# Patient Record
Sex: FEMALE | Race: WHITE
[De-identification: names, ages, dates, MRNs, and addresses within clinical notes are randomized per-mention and may not be internally consistent; named-entity substitution may affect disease eponyms.]

---

## 2019-01-28 ENCOUNTER — HOSPITAL ENCOUNTER (OUTPATIENT)
Dept: HOSPITAL 96 - M.RAD | Age: 69
End: 2019-01-28
Attending: FAMILY MEDICINE
Payer: MEDICARE

## 2019-01-28 DIAGNOSIS — N63.21: ICD-10-CM

## 2019-01-28 DIAGNOSIS — N63.23: ICD-10-CM

## 2019-01-28 DIAGNOSIS — Z12.31: Primary | ICD-10-CM

## 2019-02-01 ENCOUNTER — HOSPITAL ENCOUNTER (OUTPATIENT)
Dept: HOSPITAL 96 - M.ULTRA | Age: 69
Discharge: HOME | End: 2019-02-01
Attending: FAMILY MEDICINE
Payer: MEDICARE

## 2019-02-01 DIAGNOSIS — C50.912: Primary | ICD-10-CM

## 2019-02-01 DIAGNOSIS — J44.9: ICD-10-CM

## 2019-02-01 DIAGNOSIS — Z98.890: ICD-10-CM

## 2019-02-01 DIAGNOSIS — Z79.899: ICD-10-CM

## 2019-02-01 DIAGNOSIS — Z88.0: ICD-10-CM

## 2019-02-01 DIAGNOSIS — Z90.49: ICD-10-CM

## 2019-02-01 DIAGNOSIS — Z90.710: ICD-10-CM

## 2019-02-01 DIAGNOSIS — Z80.42: ICD-10-CM

## 2019-02-01 DIAGNOSIS — Z98.51: ICD-10-CM

## 2019-02-01 DIAGNOSIS — I10: ICD-10-CM

## 2019-02-01 DIAGNOSIS — Z80.0: ICD-10-CM

## 2019-02-01 DIAGNOSIS — Z80.8: ICD-10-CM

## 2019-02-01 DIAGNOSIS — E03.9: ICD-10-CM

## 2019-02-08 NOTE — PATH
[address]  [address]                    PATHOLOGY RPT PROCEDURE       
_______________________________________________________________________________
 
Name:       MEMEKEENAN STARK           Room:                      REG CLI 
M.R.#:  [id number]      Account #:      [id number]  
Admission:  02/01/19     Date of Birth:  [birth year]  
Discharge:                             Report #:    [id number]
                                                         Path Case #: [id number]
_______________________________________________________________________________
 
[facility] Accession Number: [id number]
.                                                                01
Material submitted:                                        .
PART A: LEFT BREAST MASS
PART B: LEFT BREAST MASS
.                                                                01
Clinical history:                                          .
A. 1.21 x 1.0 x 1.34 cm mass, 4:00, 3 cm FN
B. 3.89 x 2.87 x 3.03 cm mass, 12:30, 4 cm FN
.                                                                02
**********************************************************************
Diagnosis:
A.  Left breast mass, 4:00, 3 cm from nipple, image guided core biopsies:
- Ductal carcinoma in situ (DCIS), nuclear grade III, comedo type,
spanning 1 mm.  See comment.
.
B.  Left breast mass, 12:30, 4 cm from nipple, image guided core biopsies:
- Infiltrating ductal adenocarcinoma, intermediate grade (II/III) spanning
at least 9 mm, in association with DCIS, high-grade, comedo type.  See
comment.
.
(ZULLY:fransico; 02/04/2019)
MBR/02/04/2019
**********************************************************************
.                                                                02
Comment:
Specimen A (4:00) consists mostly of fat with focal high-grade DCIS seen
in A2.
.
Synoptic data for specimen B (12:30) is as follows:
.
Specimen type:                Image guided core biopsy
Tumor site:                   Left breast, 12:30, 4 cm from nipple
Tumor quantitation:           Approximately 80% of submitted tissues
Histologic type:              Ductal adenocarcinoma
Histologic grade:             II/III
Tubules, nuclei and mitoses:  3, 2, 2
LVSI:                         Not identified
Microcalcifications:          Not identified
Markers:                      Pending
Block:                        B3
.
Breast tumor profile studies are pending on B3 and will be the subject of
an addendum report.  Specimens A and B reviewed with Dr. Randy Aguila who
agrees with the diagnosis.
.
Pennie (acting [facility] breast navigator) notified at approximately 1420 on
2/4/2019.
 
[place] [address]                    PATHOLOGY RPT PROCEDURE       
_______________________________________________________________________________
 
Name:       KEENAN VALENTINE           Room:                      REG CLI 
M.R.#:  [id number]      Account #:      [id number]  
Admission:  02/01/19     Date of Birth:  [birth year]  
Discharge:                             Report #:    [id number]
                                                         Path Case #: [id number]
_______________________________________________________________________________
.
(ZULLY:fransico; 02/04/2019)
.                                                                02
Addendum:                                                       .
Special studies report received from [facility], [address] [place] [address], [address], [place], [address], on case -[id number],
labeled with their number [id number], dated 02/08/2019.
.
Breast/Prognostic Marker Analysis
.
Specimen Site:  Lt Breast, Mass, Breast cancer.
Specimen ID #:  [id number]
.
ER (Estrogen Receptor)
Present/Positive
Percent:   70.00%
Analysis:  Manual
Comments:  Staining Intensity:  Strong
.
LA (Progesterone Receptor)
Present/Positive
Percent:   5.00%
Analysis:  Manual
Comments:  Staining Intensity:  Moderate to Strong.
.
HER2
Not Over-Expressed
Score:     1+
Analysis:  Manual
.
Ki-67
Low Proliferation
Percent:   10.00%
Analysis:  Manual
.
Time to Fixation (Cold Ischemic Time):  Less than 1 minute
Duration of Fixation:                   12 hours 13 minutes
Type of Fixative:                       10% Neutral Buffered Formalin
.
Electronically Signed by Anibal Ramos MD on 02/08/2019 10:20 AM MST
at [employer].
Anibal Ramos MD
Surgical Pathologist
.
.
Methodology
The HER2 Receptor protein expression is analyzed using the Proberta HER2
rabbit monoclonal antibody (clone [id number]). This assay is used for diagnostic
determination of the HER2 protein over-expression in paraffin embedded,
 
[place] [address]                    PATHOLOGY RPT PROCEDURE       
_______________________________________________________________________________
 
Name:       KEENAN VALENTINE           Room:                      REG CLI 
M.R.#:  [id number]      Account #:      [id number]  
Admission:  02/01/19     Date of Birth:  [birth year]  
Discharge:                             Report #:    [id number]
                                                         Path Case #: [id number]
_______________________________________________________________________________
formalin fixed breast cancer tissue on the Proberta Benchmark. The specimen
is processed using a polymer detection system. The membrane staining of
the tumor is determined either by manual score or image analysis. This
antibody is intended for in vitro diagnostic use. The score is reported as
per package insert; 0, 1+, 2+, and 3+. This test is used for clinical
purposes.
.
A rabbit monoclonal antibody (clone SP1) that recognized the Estrogen
Receptor is used to perform immunohistochemistry on routinely fixed
(formalin) paraffin embedded tissue on the Proberta Benchmark. The specimen
is processed using a polymer detection system. The percentage of stained
tumor nuclei is determined either manually or by image analysis. This test
is intended for in vitro diagnostic use. This test is used for clinical
purposes.
.
A rabbit monoclonal antibody (clone 1E2) that recognized the Progesterone
Receptor is used to perform immunohistochemistry on routinely fixed
(formalin) paraffin embedded tissue on the Proberta Benchmark. The specimen
is processed using a polymer detection system. The percentage of stained
tumor nuclei is determined either manually or by image analysis. This test
is intended for in vitro diagnostic use. This test is used for clinical
purposes.
.
A rabbit monoclonal antibody (clone 30-9) that recognized Ki67 is used to
perform immunohistochemistry on routinely fixed (formalin) paraffin
embedded tissue on the Proberta Benchmark. The specimen is processed using
a polymer detection system. The percentage of stained tumor nuclei is
determined either manually or by image analysis. This test is intended for
in vitro diagnostic use. This test is used for clinical purposes.
.
Intended Use:
This antibody is intended for in vitro diagnostic (IVD) use. HER2 (4B5) is
a rabbit monoclonal antibody intended for the semi-quantitative detection
of HER2 antigen in sections of formalin-fixed, paraffin embedded normal
and neoplastic tissue.
.
This antibody is intended for in vitro diagnostic (IVD) use. Estrogen
Receptor (ER) (SP1) is a rabbit monoclonal antibody (IgG) that is intended
for the qualitative detection of estrogen receptor (ER) antigen in
sections of formalin-fixed, paraffin-embedded tissue. ER is a rabbit
monoclonal antibody that recognizes human estrogen receptor alpha.
.
This antibody is intended for in vitro diagnostic (IVD) use. Progesterone
Receptor (LA) (1E2) is a rabbit monoclonal antibody (IgG) that is intended
for the qualitative detection of progesterone receptor (LA) antigen in
sections of formalin fixed, paraffin embedded tissue. LA is a rabbit
monoclonal antibody that recognizes the A and B forms of the human
progesterone receptor.
.
 
[place] [address]                    PATHOLOGY RPT PROCEDURE       
_______________________________________________________________________________
 
Name:       KEENAN VALENTINE KEENA           Room:                      REG CLI 
M.R.#:  [id number]      Account #:      [id number]  
Admission:  02/01/19     Date of Birth:  [birth year]  
Discharge:                             Report #:    [id number]
                                                         Path Case #: [id number]
_______________________________________________________________________________
This antibody is intended for in vitro diagnostic (IVD) use. Ki-67 (30-9)
is a rabbit monoclonal antibody (IgG) directed against C-terminal portion
of Ki-67 antigen. Staining for Ki-67 can be used to aid in assessing the
proliferative activity of normal and neoplastic tissue. Ki-67 is a nuclear
protein expressed in proliferating cells. During the cell cycle, the Ki-67
antigen is present in the G1, S, G2 and M phase but is absent in the G0
(quiescent phase).
.
.
Disclaimer:
This Test was performed by [facility] at [address], [address], [place], [address].
.
[facility] is a business unit of [employer] a wholly-owned subsidiary of [employer].
.
This assay has not been validated on decalcified tissues. Results should
be interpreted with caution if this specimen was decalcified given the
likelihood of false negativity on decalcified specimens.
.
Any image(s) that accompany this report is/are a representative image(s)
only and should not be used to render a diagnosis.
.
This interpretation is contingent on the specimen and the clinical
information received.
.
For any special tests/stains performed, known positive cells or tissues
are tested with each marker and examined to ensure positivity. Positive
and negative internal controls, if present, react appropriately.
.
This analysis is an adjunct to the evaluation of the referring physician
and does not represent a final diagnosis.
.
The immunohistochemistry tests performed at [facility] were validated on tissue fixed in 10% neutral buffered
formalin. The performance characteristics of the tests performed on tissue
processed in other fixatives is not known.
.
HER2 testing at [facility] [employer] is performed in
compliance with the 2018 updated ASCO/CAP Clinical Practice Guideline
Focused Update. If the result is EQUIVOCAL (2+), it must be confirmed by
an alternative assay such as FISH or Dual DARIAN.
REF: Leeann DALEY, DOUGLAS Apodaca et al: Human Epidermal Growth Factor Receptor 2
Testing in Breast Cancer: ASCO/CAP Clinical Practice Guideline Focused
Update. J Clin Oncol 36:4462-9777, 2018.
.
HER2 and ER/LA ASCO/CAP guidelines require fixation in neutral buffered
formalin for a minimum of 6 and a maximum of 72 hours. Fixation [place] [address]                    PATHOLOGY RPT PROCEDURE       
_______________________________________________________________________________
 
Name:       KEENAN VALENTINE           Room:                      REG CLI 
M.JEANNETTE#:  [id number]      Account #:      [id number]  
Admission:  02/01/19     Date of Birth:  [birth year]  
Discharge:                             Report #:    [id number]
                                                         Path Case #: [id number]
_______________________________________________________________________________
than 6 hours may not adequately preserve cell proteins. Fixation times
longer than 72 hours may cause excess cross-linking of proteins reducing
the antigen available for staining. Either scenario can cause reduced
staining; hence false negative results are possible and should be
considered for these situations if the HER2 IHC score is less than 3+ or
ER or LA is negative (no staining or <1% positive). It is recommended that
specimens fixed longer than 72 hours with HER2 IHC scores less than 3+ be
confirmed by HER2 FISH or Dual DARIAN. The time from biopsy/excision to
fixation in formalin (cold ischemic time) must be less than 1 hour. Time
to fixation (cold ischemic time) greater than 1 hour should be interpreted
with caution. HER2 testing, mainly HER2 by FISH, is particularly
vulnerable since excessive cold ischemic time results in preferential loss
of HER2 probe signals that may lead to false negative results.
.
SCORE    STAINING PATTERN IN TUMOR CELLS             INTERPRETATION
RESULTS
0        No staining observed or incomplete, faint membrane staining in
         less than or equal to 10% of tumor cells.
                                                     Negative
1+       Incomplete, faint membrane staining in greater than 10% of tumor
         cells.
                                                     Negative
2+       Incomplete and/or weak/moderate circumferential membrane staining
         in greater than 10% of the invasive tumor cells or complete,
         circumferential, intense alternative assay staining in less than
         or equal to 10% of invasive tumor cells.
                                                     Equivocal*
                                                     *Must be confirmed by
                                                     alternative assay
                                                     (IHC/FISH/Dual DARIAN)
3+        Intense, complete membrane staining in greater than 10% of tumor
          cells.
                                                     Positive
.
A complete copy of the report is on file.
.
Professional and Technical services performed by [facility]. at [address], [place] [address].
.
(AMJ 02/08/2019)
[facility]/02/08/2019
Addendum Electronically Signed by Simone Kidd MD, Pathologist
.                                                                02
Electronically signed:                                     .
Simone Kidd MD, Pathologist
NPI- [id number]
.                                                                01
Gross description:                                         .
A.  Received in formalin labeled "Keenan Valentine, left breast BX," [place] [address]                    PATHOLOGY RPT PROCEDURE       
_______________________________________________________________________________
 
Name:       KEENAN VALENTINE KEENA           Room:                      REG CLI 
M.R.#:  [id number]      Account #:      [id number]  
Admission:  02/01/19     Date of Birth:  [birth year]  
Discharge:                             Report #:    [id number]
                                                         Path Case #: [id number]
_______________________________________________________________________________
additionally labeled on the requisition as "4:00 3CFN," are multiple
needle cores of yellow-gray fibrofatty tissue measuring 0.6 x 0.4 x 0.1 cm
in aggregate dimensions.  The tissue is submitted in its entirety in
cassettes A1 through A3.  The cold ischemic time is 1 minute.  The total
formalin fixation time is 12 hours and 9 minutes.
.
B.  Received in formalin labeled "Keenan Valentine, left breast BX," and
additionally labeled on requisition as "1230 4CFN," are multiple needle
cores of yellow-gray fibrofatty tissue measuring 1.9 x 0.7 x 0.2 cm in
aggregate dimensions.  The tissue is submitted in its entirety in
cassettes B1 through B3.  The cold ischemic time is less than 1 minute.
The total formalin fixation time is 12 hours and 13 minutes.
(TSD; 2/1/2019)
TOB/TOB
.                                                                02
Pathologist provided ICD-10:
D05.12, C50.912
.                                                                02
CPT                                                        .
429501, 495687
Specimen Comment: A courtesy copy of this report has been sent to
Specimen Comment: [phone], [phone], [phone].
Specimen Comment: Report sent to ,DR MICHAEL / DR DURAN
Specimen Comment: A duplicate report has been generated due to demographic
updates.
***Performed at:  01
   [place] [id number] [facility] [address], [place]  [id number]
   MD Derek Rodriguez MD Phone:  [phone]
***Performed at:  02
   [facility]
   [address], [place], [place]  [id number]
   MD Simone Kidd MD Phone:  [phone]

## 2019-03-01 ENCOUNTER — HOSPITAL ENCOUNTER (OUTPATIENT)
Dept: HOSPITAL 96 - M.RTH | Age: 69
End: 2019-03-01
Attending: RADIOLOGY
Payer: MEDICARE

## 2019-03-01 DIAGNOSIS — C50.512: Primary | ICD-10-CM

## 2019-03-07 NOTE — CON
[address]  [address]                    CONSULTATION                  
_______________________________________________________________________________
 
Name:       KEENAN VALENTINE           Room:                      REG CLI 
M.R.#:  [id number]      Account #:      [id number]  
Admission:  19     Attend Phys:    Sarthak Booker MD    
Discharge:               Date of Birth:  [birth year]  
         Report #: [id number]
                                                                     [id number]  
_______________________________________________________________________________
THIS REPORT FOR:  //name//                      
 
CC: Sarthak Ribeiro
   
 
DATE OF CONSULTATION:  2019
 
[facility] Phone: [phone]
 
 
REFERRING PHYSICIANS:  Dr. Keenan Coleman, Dr. Ariana Chi.  Dr. Tali Ribeiro.
 
PRIMARY SITE AND HISTOPATHOLOGY:  The patient has an infiltrating ductal
carcinoma that is involving the 12:30 position of the left breast as well as a
ductal carcinoma in situ involving the 4 o'clock position of the left breast.
 
HISTORY OF PRESENT ILLNESS:  The patient is a 68-year-old woman who noted a
palpable mass at the 4 o'clock position of the left breast around 10/2018.  She
denied having any nipple discharge.  The mass persisted, so she went on to
undergo a bilateral mammogram on 2019..  That revealed a large spiculated
mass in the upper side of the outer left breast at  the 1 o'clock position and
a smaller mass was present lateral to the nipple.  The patient ended up having
an ultrasound-guided core biopsy on 2019.  An ultrasound on 2019 at
the 12:30  position measured 4.2 cm.x 3 cm x 2.2 cm.  There was additional area
at the 4 o'clock position that measured 2.4 cm.x 2 cm.x 1.4 cm.  The biopsy of
the mass at the 12:30 position was an infiltrating ductal carcinoma.  It was
intermediate grade 2/3, spanning at least 0.9 cm. The mass at the 4 o'clock
position, was a nuclear grade 3 comedo type mass.  The breast cancer was 70%
estrogen receptor positive, 5% progesterone receptor positive and HER2/suman
negative, Ki-67 was 10%.  She presents to discuss treatment options.
 
PAST MEDICAL HISTORY AND PAST SURGICAL HISTORY:  Includes hypertension, hiatal
hernia, history of blood transfusion.  She had nephritis as a child.  She has
hemorrhoids.  She is hypothyroid. 
 
MEDICATIONS:  Hydrochlorothiazide, amlodipine, clonidine, Singulair, Synthroid,
Advair, paroxetine, Ventolin, Spiriva.
 
SURGICAL PROCEDURE:  She was treated for nephritis in , for hepatitis in
.  She had a tubal ligation in .  She had a hysterectomy in , and
she also had a fractured ankle in the past and was also treated for cellulitis
in the past.
 
 
 
[place] [address]                    CONSULTATION                  
_______________________________________________________________________________
 
Name:       KEENAN VALENTINENE           Room:                      REG CLI 
M.RLiss#:  [id number]      Account #:      [id number]  
Admission:  19     Attend Phys:    Sarthak Booker MD    
Discharge:               Date of Birth:  [birth year]  
         Report #: [id number]
                                                                     [id number]  
_______________________________________________________________________________
 
OBSTETRICAL AND GYNECOLOGIC HISTORY:  Menarche at age 12, menopause at age 43. 
She is  3, para 4.
 
FAMILY HISTORY:  Mother had a brain tumor.
 
SOCIAL HISTORY:  The patient is retired.  She is .  She has 1 son, 3
daughters.  Cigarettes: she smoked for about 2 years, and she quit smoking in
.  Ethanol: she has not drank any alcohol containing beverages since .
 
REVIEW OF SYSTEMS:
GENERAL:  She denied having any fevers or chills.
SKIN:  She says that she does have some items that can give her a rash
sometimes.
LYMPH NODES:  She denied having enlarged or painful glands in the neck.
ENDOCRINE:  She denied any hot or cold intolerance.
HEMATOLOGY/IMMUNOLOGY:  She denied having any anemia or recent bleeding.
MUSCULOSKELETAL:  She denied having any arthritis or painful swollen joints.
HEAD AND NECK:  She denied having any headaches, migraines.
RESPIRATORY:  She does have history of asthma.
CARDIOVASCULAR:  She denied having palpitations.
GASTROINTESTINAL:  She denied having nausea.
RECTAL:  She denied having focal weakness.
 
PHYSICAL EXAMINATION:  With my nurse, Tania Lnaier, present:
VITAL SIGNS:  Height 5 feet 4-1/2 inches, weight 329.8 pounds, blood pressure
171/76, pulse 77, respirations 20, oxygen saturation 92%.
LYMPH NODES:  She had no palpable cervical, supraclavicular or axillary
lymphadenopathy.
EYES:  Pupils were equal, round, reactive
to light and accommodation.  Extraocular
movements were intact.
HEAD, EARS, NOSE AND THROAT:  Mouth had no visible lesions.
HEART:  Had a regular rate and rhythm without murmur.
LUNGS: were clear to auscultation.
BREASTS:  Right breast had no suspicious palpable masses.  Left breast in the
upper outer quadrant had a mass that measured about 4 cm. x 4 cm.
ABDOMEN:  Nontender.  Spleen was not palpable.  Liver was at the costal margin.
EXTREMITIES:  Had no clubbing, cyanosis or edema.
NEUROLOGIC:  Cranial nerves 2-12 were
intact.  Sensation was intact.  She had 5/5
strength in her extremities.
 
ASSESSMENT AND PLAN:  The patient has 2 foci of breast cancer in the left
breast.  The patient was told that her local treatment options include breast
conservation therapy versus mastectomy.  The efficacy of those treatment
 
 
 
[address] [place]  [address]                    CONSULTATION                  
_______________________________________________________________________________
 
Name:       KEENAN VALENTINE           Room:                      REG CLI 
M.R.#:  [id number]      Account #:      [id number]  
Admission:  19     Attend Phys:    Sarthak Booker MD    
Discharge:               Date of Birth:  [birth year]  
         Report #: [id number]
                                                                     [id number]  
_______________________________________________________________________________
options can be found in the protocol from [facility] B-06 where patients with stage
1 and 2 breast cancer were randomized between total mastectomy versus
lumpectomy alone versus lumpectomy and radiation therapy.  At 20-year followup,
there was no significant difference in overall survival between the 3 treatment
groups.  The addition of radiation therapy to lumpectomy reduced the local
failure rate from 39% to 14%.  At this point, the patient appears to be leaning
towards mastectomy because there are the 2 foci of breast cancer.  So it is not
clear if there would be a reasonable cosmesis after attempted breast
conservation therapy, and she also would be reluctant to undergo reexcision if
she had closer positive margins, but she said she will be talking to her
surgeon to make a final decision.  She says that she is tentatively scheduled
for her operation on the .  She had seen the medical oncologist, Dr. Ribeiro, 
and there appears to be a likelihood that she will get chemotherapy.  So the
patient was told that if she does undergo a mastectomy, then radiation therapy
is given if there are high risk features, otherwise she will not receive
radiation treatments.  So, the patient was given a copy of the consent form,
and then, she was asked to follow up with me in about 2 months so that her
pathology can be reviewed at that point and make final treatment management
decisions.
 
Thank you very much for this consult.
 
 
 
 
 
 
 
 
 
 
 
 
 
 
 
 
 
 
 
 
 
 
 
<ELECTRONICALLY SIGNED>
                                        By:  Sarthak Booker MD             
19     1952
D: 19 1837_______________________________________
T: 19 1607Dajessica Booker MD                /nt

## 2019-03-12 ENCOUNTER — HOSPITAL ENCOUNTER (INPATIENT)
Dept: HOSPITAL 96 - M.SUR | Age: 69
LOS: 2 days | Discharge: HOME HEALTH | DRG: 580 | End: 2019-03-14
Attending: SURGERY | Admitting: SURGERY
Payer: MEDICARE

## 2019-03-12 VITALS — DIASTOLIC BLOOD PRESSURE: 64 MMHG | SYSTOLIC BLOOD PRESSURE: 144 MMHG

## 2019-03-12 VITALS — WEIGHT: 293 LBS | BODY MASS INDEX: 50.02 KG/M2 | HEIGHT: 64 IN

## 2019-03-12 VITALS — DIASTOLIC BLOOD PRESSURE: 73 MMHG | SYSTOLIC BLOOD PRESSURE: 142 MMHG

## 2019-03-12 DIAGNOSIS — C50.912: Primary | ICD-10-CM

## 2019-03-12 DIAGNOSIS — E66.01: ICD-10-CM

## 2019-03-12 LAB
ALBUMIN SERPL-MCNC: 3.4 G/DL (ref 3.4–5)
ALP SERPL-CCNC: 74 U/L (ref 46–116)
ALT SERPL-CCNC: 19 U/L (ref 30–65)
ANION GAP SERPL CALC-SCNC: 7 MMOL/L (ref 7–16)
AST SERPL-CCNC: 17 U/L (ref 15–37)
BILIRUB SERPL-MCNC: 1 MG/DL
BUN SERPL-MCNC: 22 MG/DL (ref 7–18)
CALCIUM SERPL-MCNC: 9.3 MG/DL (ref 8.5–10.1)
CHLORIDE SERPL-SCNC: 104 MMOL/L (ref 98–107)
CO2 SERPL-SCNC: 32 MMOL/L (ref 21–32)
CREAT SERPL-MCNC: 1.3 MG/DL (ref 0.6–1.3)
GLUCOSE SERPL-MCNC: 103 MG/DL (ref 70–99)
HCT VFR BLD CALC: 44.8 % (ref 37–47)
HGB BLD-MCNC: 15.4 GM/DL (ref 12–15)
MCH RBC QN AUTO: 31.6 PG (ref 26–34)
MCHC RBC AUTO-ENTMCNC: 34.2 G/DL (ref 28–37)
MCV RBC: 92.4 FL (ref 80–100)
MPV: 10 FL. (ref 7.2–11.1)
PLATELET COUNT*: 171 THOU/UL (ref 150–400)
POTASSIUM SERPL-SCNC: 3.8 MMOL/L (ref 3.5–5.1)
PROT SERPL-MCNC: 7.7 G/DL (ref 6.4–8.2)
RBC # BLD AUTO: 4.85 MIL/UL (ref 4.2–5)
RDW-CV: 13.4 % (ref 10.5–14.5)
SODIUM SERPL-SCNC: 143 MMOL/L (ref 136–145)
WBC # BLD AUTO: 8 THOU/UL (ref 4–11)

## 2019-03-12 NOTE — H
[address]  [address]                    HISTORY AND PHYSICAL          
_______________________________________________________________________________
 
Name:       KEENAN VALENTINE           Room:           [address] [place]  
M.R.#:  [id number]      Account #:      [id number]  
Admission:  03/12/19     Attend Phys:    Ariana Chi DO
Discharge:  03/14/19     Date of Birth:  [birth year]  
         Report #: [id number]
                                                                                
_______________________________________________________________________________
THIS REPORT FOR:  //name//                      
 
Please refer to the History and Physical performed in the physician's office.
 
 
 
 
 
 
 
 
 
 
 
 
 
 
 
 
 
 
 
 
 
 
 
 
 
 
 
 
 
 
 
 
 
 
 
 
 
 
 
 
 
                       
                                        By:                                
                 
D: 03/12/19     _______________________________________
T: 03/18/19 0646Medical Records Staff LOULOU       /AL

## 2019-03-13 VITALS — SYSTOLIC BLOOD PRESSURE: 128 MMHG | DIASTOLIC BLOOD PRESSURE: 64 MMHG

## 2019-03-13 VITALS — DIASTOLIC BLOOD PRESSURE: 68 MMHG | SYSTOLIC BLOOD PRESSURE: 145 MMHG

## 2019-03-13 VITALS — DIASTOLIC BLOOD PRESSURE: 68 MMHG | SYSTOLIC BLOOD PRESSURE: 138 MMHG

## 2019-03-13 VITALS — SYSTOLIC BLOOD PRESSURE: 128 MMHG | DIASTOLIC BLOOD PRESSURE: 47 MMHG

## 2019-03-13 VITALS — DIASTOLIC BLOOD PRESSURE: 66 MMHG | SYSTOLIC BLOOD PRESSURE: 129 MMHG

## 2019-03-13 LAB
HCT VFR BLD CALC: 38.5 % (ref 37–47)
HGB BLD-MCNC: 12.9 GM/DL (ref 12–15)
MCH RBC QN AUTO: 31.1 PG (ref 26–34)
MCHC RBC AUTO-ENTMCNC: 33.4 G/DL (ref 28–37)
MCV RBC: 93 FL (ref 80–100)
MPV: 10.1 FL. (ref 7.2–11.1)
PLATELET COUNT*: 182 THOU/UL (ref 150–400)
RBC # BLD AUTO: 4.14 MIL/UL (ref 4.2–5)
RDW-CV: 13.7 % (ref 10.5–14.5)
WBC # BLD AUTO: 11.5 THOU/UL (ref 4–11)

## 2019-03-13 NOTE — OP
[address] [place]  [address]                    OPERATIVE REPORT              
_______________________________________________________________________________
 
Name:       KEENAN VALENTINE           Room:           [address] [place]  
M.R.#:  [id number]      Account #:      [id number]  
Admission:  03/12/19     Attend Phys:    Ariana Chi DO
Discharge:               Date of Birth:  [birth year]  
         Report #: [id number]
                                                                     [id number]  
_______________________________________________________________________________
THIS REPORT FOR:  //name//                      
 
CC: Ariana Coleman
 
DICTATED BY: Davidson Naranjo DO
 
DATE OF SERVICE:  03/12/2019
 
 
PREOPERATIVE DIAGNOSIS:  Left breast cancer.
 
POSTOPERATIVE DIAGNOSIS:  Left breast cancer.
 
PRIMARY SURGEON:  Ariana Chi DO
 
COSURGEON:  Davidson Naranjo DO, PGY-2.
 
ASSISTANT:  Joseph Portillo, MS3.
 
OPERATION PERFORMED:  Right ultrasound and fluoroscopy guided right internal
jugular chemo port placement, tunneled catheter.
 
FLUOROSCOPY TIME:  24 seconds.
 
Surgeon interpretation of fluoroscopy, tip of the Port-A-Cath visualized in the
SVC, surgeon evaluation of ultrasound, needle and then guidewire in the internal
jugular vein.
 
ANESTHESIA TYPE:  General and local.
 
ESTIMATED BLOOD LOSS:  5
.
 
SPECIMENS:  None.
 
IMPLANTS:  Port-A-Cath.
 
COMPLICATIONS:  None.
 
CONDITION:  Stable.
 
DISPOSITION:  PACU to floor.
 
FINDINGS:  Right internal jugular chemo port placement post-ultrasound and
fluoroscopy guidance with surgeon interpretation of imaging with tip of catheter
 
 
 
[address] [place]  [address]                    OPERATIVE REPORT              
_______________________________________________________________________________
 
Name:       KEENAN VALENTINE           Room:           [address] [place]  
[facility]#:  [id number]      Account #:      [id number]  
Admission:  03/12/19     Attend Phys:    Ariana Chi DO
Discharge:               Date of Birth:  [birth year]  
         Report #: [id number]
                                                                     [id number]  
_______________________________________________________________________________
at the atriocaval junction.
 
INDICATIONS:  The patient is a pleasant 68-year-old female who presented to the
office with left breast cancer.  The patient was subsequently recommended to
undergo bilateral simple mastectomy, left superficial sentinel lymph node
dissection, right IJ ultrasound and fluoro-guided chemo port placement with
surgeon
interpretation of images.  The patient agreed with the recommendations and
to proceed with surgery.  Full discussion of procedure, alternatives, risks and
possible complications discussed, to include but not limited to bleeding,
infection, postoperative pain, scarring, pneumothorax, hemothorax, hematoma and
anesthesia risks.  The patient voiced understanding of these risks and agreed to
proceed with surgery.
 
TECHNIQUE:  The patient remained intubated, status post bilateral mastectomy. 
Drapes were taken down.  The patient remained in the supine position.  Arms on
arm boards were placed at the patient's side, remained to have bilateral lower
extremity SCDs to calves, grounding pad to right lateral thigh.  The patient was
then prepped and draped using standard sterile fashion.  Timeout was performed
prior to onset of procedure, began by evaluating the right internal jugular vein
using ultrasound.  Once this was performed, 5 mL of 0.5% Marcaine were used for
local anesthetic and internal jugular vein was accessed with 2 passes of the
needle.  Once the needle was placed, after this a guidewire was placed through
the needle.  C-arm was brought onto the field, verified wire was in the internal
jugular and resting at the atriocaval junction.  Ultrasound was also used to
evaluate the wire as it coursed through the internal jugular vein.  Once this
was performed, the needle was removed and chemo port pocket was made just
inferior to the clavicle on the right, dissecting down through subcutaneous
tissue down to the level of the pectoralis fascia.  A 3 cm horizontal incision
was made at the skin for this pocket to be created.  Once this was performed, an
11 blade scalpel was used to make incision at the wire for a dilator and
catheter placement.  Dilator was passed over the wire into the jugular vein. 
Wire and dilator were removed.  Sheath remained.  Then, previously flushed
Port-A-Cath catheter was brought into the field, placed through the sheath into
the internal jugular.  Once this was performed, C-arm was brought back on to the
field and again the catheter was noted to be at the atriocaval junction.  Sheath
removed, split in the middle and removed as the catheter was held in place. 
Once this was performed, a tunneler was brought onto the field and from the neck
to the previously made chest wall pocket and was tunneled underneath the skin
over the clavicle.  A catheter was placed on to the tunnel and brought through
previously made tunnel to the pocket.  Once this was performed, chemo port was
brought onto the field.  It was sewn in place using 2-0 Prolenes on both left
and right sides.  A catheter was threaded onto the chemo port and the clear cap
was used to lock this in place.  A catheter easily flushed with Calzada needle,
normal saline as well as aspirated.  It was then heparinized locked.  Once this
was performed using a layered closure, subcutaneous and deep dermal layers were
 
 
 
[address]  [address]                    OPERATIVE REPORT              
_______________________________________________________________________________
 
Name:       KEENAN VALENTINE           Room:           [address] [place]  
M.R.#:  [id number]      Account #:      [id number]  
Admission:  03/12/19     Attend Phys:    Ariana Chi DO
Discharge:               Date of Birth:  [birth year]  
         Report #: [id number]
                                                                     [id number]  
_______________________________________________________________________________
closed using 3-0 Vicryl and skin using running 4-0 Monocryl.  Neck incision was
closed using a simple interrupted 4-0 Monocryl.  Skin was cleansed using wet and
dry lap and Dermabond skin glue was used over the skin.  C-arm was again brought
in the field one last time, verified that the catheter remained at the
atriocaval junction and there were no kinks in the catheter.  The patient was
extubated in the OR, transferred to the PACU in stable condition after a brief
recovery from anesthesia.  We will plan to transfer to the floor for continued
observation overnight.
 
 
 
 
 
 
 
 
 
 
 
 
 
 
 
 
 
 
 
 
 
 
 
 
 
 
 
 
 
 
 
 
 
 
 
 
<ELECTRONICALLY SIGNED>
                                        By:  Ariana Chi DO         
03/13/19     1247
D: 03/12/19 1559_______________________________________
T: 03/12/19 1852Cha Chi DO            /nt

## 2019-03-13 NOTE — OP
[address]  [address]                    OPERATIVE REPORT              
_______________________________________________________________________________
 
Name:       KEENAN VALENTINE           Room:           [address] [place]  
M.R.#:  [id number]      Account #:      [id number]  
Admission:  03/12/19     Attend Phys:    Ariana Chi DO
Discharge:               Date of Birth:  [birth year]  
         Report #: [id number]
                                                                     [id number]  
_______________________________________________________________________________
THIS REPORT FOR:  //name//                      
 
CC: Ariana Coleman
 
DATE OF SERVICE:  03/12/2019
 
 
PREOPERATIVE DIAGNOSIS:  Left breast cancer.
 
POSTOPERATIVE DIAGNOSIS:  Left breast cancer.
 
SURGEON:  Ariana Chi DO.
 
ASSISTANT:  Ramesh Naranjo, PGY-2.
 
PROCEDURE PERFORMED:  Bilateral simple mastectomy, left superficial sentinel
lymph node dissection.  See further dictation for the chemo port placement.
 
ANESTHESIA:  General endotracheal and local.
 
ESTIMATED BLOOD LOSS:  50.
 
DRAINS:  Bilateral 15-Bengali ANNITA drain.
 
SPECIMENS REMOVED:  Bilateral breasts and left superficial sentinel lymph node.
 
COMPLICATIONS:  None.
 
CONDITION:  Stable.
 
DISPOSITION:  PACU to the floor.
 
HISTORY OF PRESENT ILLNESS:  The patient is a 68-year-old female who presented
to my office after a change in her mammogram.  She underwent a left breast
biopsy, which was positive for invasive cancer.  She was seen by Oncology and
Radiation Oncology and then decided to move forward with bilateral mastectomy
and sentinel lymph node dissection.  Oncology did indicate that she would need
chemotherapy, so recommended a chemo port placement as well at the time of
surgery.  Risks discussed included bleeding, infection, pain, scar formation,
need for further surgery, chronic pain, numbness or swelling and risks of
general anesthesia.  The patient understood these risks and elected to proceed.
 
DESCRIPTION OF PROCEDURE:  The patient initially presented to preop and was then
taken to Radiology where she underwent a left breast nuclear medicine injection.
 She then returned to [facility] where she underwent informed consent.  She was then
 
 
 
[place] [address]                    OPERATIVE REPORT              
_______________________________________________________________________________
 
Name:       KEENAN VALENTINE           Room:           [address] [place]  
[facility].#:  [id number]      Account #:      [id number]  
Admission:  03/12/19     Attend Phys:    Ariana Chi DO
Discharge:               Date of Birth:  [birth year]  
         Report #: [id number]
                                                                     [id number]  
_______________________________________________________________________________
taken to the operating room where she was laid supine on the operating room
table.  SCDs were placed on bilateral lower extremities.  Clindamycin was given
in the perioperative period.  General endotracheal anesthesia was induced by
Anesthesia without difficulty.  Bilateral breasts, neck and chest were prepped
and draped in the standard sterile fashion.  Timeout was performed to verify
patient and procedure.  I began by marking out the bilateral clavicles, the
sternum, inframammary fold and latissimus dorsi.  Bilateral elliptical incisions
were then marked out around the breast.  We started on the right side, which was
the noncancerous side.  20 mL of 0.5% Marcaine were injected in the area of the
planned incision.  Incision was made with a 10 blade.  Cautery was used for
hemostasis.  We then began forming flaps.  We initially moved superiorly to the
clavicle, then medially to the sternum, inferiorly to the inframammary fold and
laterally to the latissimus dorsi.  All dissection was undertaken utilizing the
cautery.  The breast tissue was then gently elevated using an Allis clamp and
was dissected free from the underlying pectoralis fascia utilizing the cautery. 
Specimen was then marked in the superior and lateral direction, was handed off
for permanent pathology.  Hemostasis was assured within the wound.  The wound
was then lightly packed with moistened lap pads and covered with a clean blue
towel while we turned our attention to the left breast.  A 5 mL of Lymphazurin
blue dye were injected in the periareolar area.  20 mL of 0.5% Marcaine were
injected along the planned incision.  Incision was made with a 10 blade. 
Cautery was used for hemostasis.  Mastectomy proceeded in the same fashion as on
the right side.  Flaps were created to the clavicle, the sternum, the
inframammary fold and the latissimus dorsi.  Tissues were dissected free from
the underlying pectoralis fascia utilizing cautery.  Specimen was marked in the
superior and lateral direction.  Before it was handed off for permanent
pathology, the nipple was interrogated with the Neoprobe, and we had highest
uptake of approximately 5800 at the nipple.  Specimen was then handed off for
permanent pathology.  Her axilla was then interrogated.  There was actually a
very bright blue lymphatic which was leading down into the axilla.  In this
area, there was a very high uptake on the Neoprobe.  It was very minimal
dissection, a large blue node was then identified, our highest uptake here was
1185.  The lymph node was then dissected free and was handed off as left
superficial sentinel lymph node.  Hemostasis was then also assured on the left
side.  Both wounds were copiously irrigated until clear.  Surgiflo was
introduced into both wounds.  Two 15-Bengali bilateral ANNITA drains were introduced
on each side through the inferior pocket.  These were sutured into place using
2-0 nylon.  Both wounds were then closed in a layered fashion using deep and
superficial stitches of 3-0 Vicryl in inverted interrupted fashion.  Skin wounds
were closed with running 4-0 Monocryl.  A total of 60 mL of 0.5% Marcaine were
injected in the wounds.  Wounds were then cleansed and covered with skin glue. 
Drains were covered by 4 x 4 and a Tegaderm.  Our incisions were then gently
covered with a clean blue towel and the patient was reprepped and redraped for
 
 
 
[address]  [address]                    OPERATIVE REPORT              
_______________________________________________________________________________
 
Name:       KEENAN VALENTINE           Room:           [address] [place]  
M.R.#:  [id number]      Account #:      [id number]  
Admission:  03/12/19     Attend Phys:    Ariana Chi DO
Discharge:               Date of Birth:  [birth year]  
         Report #: [id number]
                                                                     [id number]  
_______________________________________________________________________________
insertion of the chemo port, please see our dictation for the remainder of the
procedure.
 
 
 
 
 
 
 
 
 
 
 
 
 
 
 
 
 
 
 
 
 
 
 
 
 
 
 
 
 
 
 
 
 
 
 
 
 
 
 
 
 
 
<ELECTRONICALLY SIGNED>
                                        By:  Ariana Chi DO         
03/13/19     1248
D: 03/12/19 1515_______________________________________
T: 03/12/19 1610Chrisdonald Chi DO            /nt

## 2019-03-14 VITALS — SYSTOLIC BLOOD PRESSURE: 133 MMHG | DIASTOLIC BLOOD PRESSURE: 50 MMHG

## 2019-03-14 VITALS — DIASTOLIC BLOOD PRESSURE: 50 MMHG | SYSTOLIC BLOOD PRESSURE: 133 MMHG

## 2019-03-14 VITALS — SYSTOLIC BLOOD PRESSURE: 151 MMHG | DIASTOLIC BLOOD PRESSURE: 69 MMHG

## 2019-03-14 VITALS — SYSTOLIC BLOOD PRESSURE: 141 MMHG | DIASTOLIC BLOOD PRESSURE: 68 MMHG

## 2019-03-14 NOTE — OP
[address]  [address]                    OPERATIVE REPORT              
_______________________________________________________________________________
 
Name:       KEENAN VALENTINE           Room:           [address] [place]  
M.R.#:  [id number]      Account #:      [id number]  
Admission:  03/12/19     Attend Phys:    Ariana Chi DO
Discharge:               Date of Birth:  [birth year]  
         Report #: [id number]
                                                                     [id number]  
_______________________________________________________________________________
THIS REPORT FOR:  //name//                      
 
CC: Ariana Coleman
 
DICTATED BY: Davidson Naranjo DO
 
DATE OF SERVICE:  03/13/2019
 
 
PREPROCEDURE DIAGNOSES:  Left breast cancer, status post bilateral mastectomy,
left sentinel lymph node dissection and right internal jugular chemo port
placement, now with right breast postop hematoma.
 
POSTOPERATIVE FINDINGS:  Left breast cancer, status post bilateral mastectomy,
left sentinel lymph node dissection and right internal jugular chemo port
placement, now with right breast postop hematoma.
 
PRIMARY SURGEON:  Ariana Chi DO.
 
ASSISTANT:  Davidson Naranjo DO, PGY2.
 
OPERATION PERFORMED:  Incision, drainage and evacuation of right breast
hematoma.
 
ANESTHESIA:  General and local.
 
ESTIMATED BLOOD LOSS:  3 mL.
 
SPECIMEN REMOVED:  100 mL right breast hematoma.
 
COMPLICATIONS:  None.
 
INDICATIONS FOR PROCEDURE:  The patient is a pleasant 68-year-old female, postop
day #1 status post bilateral mastectomy, left superficial sentinel lymph node
dissection, right IJ ultrasound and fluoroscopy-guided chemo port placement and
surgeon interpretation of images.  The patient did well postoperatively
overnight.  Nursing called with concerns for bruising and swelling of the right
chest and breast superior to the mastectomy flap and inferior to the chemo port
flap.  Pressure and ice was applied.  The patient was rewrapped with Ace
bandages x 2.  Hemoglobin was checked in the morning and was confirmed to be
stable.  Drains continued to function and hematoma was still present.  The
decision was made to take the patient back to the operating room for evacuation
of hematoma and the patient agreed.  Fully informed written consent was
obtained.  Full discussion of procedure, alternatives, risks and possible
complications were discussed to include but not limited to bleeding, infection,
 
 
 
[place] [address]                    OPERATIVE REPORT              
_______________________________________________________________________________
 
Name:       KEENAN VALENTINE           Room:           [address] [place]  
M.R.#:  [id number]      Account #:      [id number]  
Admission:  03/12/19     Attend Phys:    Ariana Chi DO
Discharge:               Date of Birth:  [birth year]  
         Report #: [id number]
                                                                     [id number]  
_______________________________________________________________________________
postoperative pain, recurrence of hematoma, need for further drainage.  The
patient again voiced understanding and agreed to proceed to the operating room.
 
OPERATIVE TECHNIQUE:  The patient was again seen and examined in preoperative
holding.  Fully informed written consent was obtained full discussion of
procedure, alternatives, risks, possible complications.  A 900 mg of clindamycin
was given for preoperative antibiotics.  The patient was subsequently
transported to the operating suite, placed on the operating table in supine
position.  At this time, Anesthesia induced general anesthesia via LMA and this
was successful.  SCDs were placed to the bilateral lower extremity calves.  Arms
were placed in arm boards and all the patient's extremities and joints were
padded and protected.  The patient was prepped and draped using standard sterile
fashion.  Timeout was performed prior to onset of procedure.  I began by making
a 3 cm incision over the upper outer chest near axilla superior to the
mastectomy incision.  This was previously infiltrated with 10 mL of 0.5%
Marcaine.  After incision was made, dissection was carried down to the
subcutaneous tissue until the hematoma was encountered.  A finger was introduced
and hematoma was aspirated manually using suction and finger sweeps. 
Approximately 100 mL of hematoma was evacuated.  The patient had two ANNITA drains
placed during mastectomy on both sides.  There was noted to be a drain that just
beneath our incision in the area of the clot that was not functioning.  This was
inspected and replaced into position.  After complete evacuation of hematoma,
the area was copiously irrigated with normal saline.  This was suctioned out. 
Incision was closed in a layered fashion using interrupted 3-0 Vicryl to close
subcutaneous and deep dermal layers and running 4-0 Monocryl was used to close
the skin.  Skin was cleansed using wet and dry lap.  Dermabond skin glue was
applied for sterile dressing.  The patient was extubated in the operating room,
mastectomy and chest wall were rewrapped using 4 x 4s, ABDs, and Ace wraps x 2. 
The patient was transported to the PACU in stable condition after brief for
recovery from anesthesia with plan to return to the floor for ongoing care and
observation of hematoma and normal postoperative care.
 
 
 
 
 
 
 
 
 
 
 
 
 
<ELECTRONICALLY SIGNED>
                                        By:  Ariana Chi DO         
03/14/19     1414
D: 03/13/19 1355_______________________________________
T: 03/13/19 1737Chamaris Chi DO            /nt

## 2019-03-18 NOTE — PATH
[address]  [address]                    PATHOLOGY RPT PROCEDURE       
_______________________________________________________________________________
 
Name:       KEENAN VALENTINE KEENA           Room:           [address] [place]  
.R.#:  [id number]      Account #:      [id number]  
Admission:  03/12/19     Date of Birth:  [birth year]  
Discharge:  03/14/19                   Report #:    [id number]
                                                         Path Case #: [id number]
_______________________________________________________________________________
 
[facility] Accession Number: [id number]
.                                                                01
Material submitted:                                        .
PART A: RIGHT BREAST SHORT SUPERIOR LONG LATERAL
PART B: LEFT BREAST SHORT SUPERIOR LONG LATERAL
PART C: LEFT SENTINEL LYMPH NODE
.                                                                01
Clinical history:                                          .
Ductal carcinoma
.                                                                02
**********************************************************************
Diagnosis:
A.  Breast, "right breast short superior long lateral":
- Mild fibrocystic changes.
- All surgical resection margins are free without any evidence of atypia
or malignancy.
.
B.  Breast, "left breast short superior long lateral", mastectomy:
- Infiltrating ductal carcinoma intermediate grade II measuring grossly
5.2 cm in greatest dimension extending to 0.5 cm from the anterior
inferior margin, which is the closest margin completely excised.
.
- Ductal carcinoma in situ high grade with comedo type necrosis spanning
 3.3 cm invoving 11 of 24 sections 0.2 cm from anterior superior margin
which is the closest margin completely excised.
- Immunoperoxidase stain block Ae1/Ae3 B9 and B10 positive in tumor.
.
C.  Lymph node, "left sentinel lymph node":
- Metastatic ductal carcinoma.  The largest focus measures 0.5 cm with
extracapsular extension.
- Immunoperoxidase stain Ae1/Ae3 is positive
.
(SHA:fransico; 03/15/2019)
MBR/03/18/2019
**********************************************************************
.                                                                02
Comment:
Representative sections including margins status block B9 and B10
and lymphnode was also reviewed by Dr. Dawit Rogers.
.
Surgical Pathology Cancer Case Summary
INVASIVE CARCINOMA OF THE BREAST:
.
Procedure
___ Total mastectomy (including nipple-sparing and skin-sparing
mastectomy)
.
Specimen Laterality
 
[place] [address]                    PATHOLOGY RPT PROCEDURE       
_______________________________________________________________________________
 
Name:       KEENNA VALENTINE           Room:           [address] [place]  
M.R.#:  [id number]      Account #:      [id number]  
Admission:  03/12/19     Date of Birth:  [birth year]  
Discharge:  03/14/19                   Report #:    [id number]
                                                         Path Case #: [id number]
_______________________________________________________________________________
___ Left
.
+ Tumor Site: Invasive Carcinoma
+ ___ Other (specify): 4:00 and 12:30
.
Tumor Size
___ Greatest dimension of largest invasive focus >1 mm: 5.2 cm
+ Additional dimensions: 2.7 x 1.5 cm
.
Histologic Type
___ Invasive carcinoma of no special type (ductal, not otherwise
specified)
.
Glandular (Acinar)/Tubular Differentiation
___ Score 3 (<10% of tumor area forming glandular/tubular structures)
.
Nuclear Pleomorphism
___ Score 2 (cells larger than normal with open vesicular nuclei, visible
nucleoli, and moderate variability in both size and shape)
.
Mitotic Rate
___ Score 2 (4-7 mitoses per mm2) (see Table 1)
.
Overall Grade
___ Grade 2 (scores of 6 or 7)
.
+ Tumor Focality
+ ___ Single focus of invasive carcinoma
.
Ductal Carcinoma In Situ (DCIS)
___ Present
+ ___ Positive for extensive intraductal component
.
+ Size (Extent) of DCIS
+ Estimated size (extent) of DCIS (greatest dimension using gross and
microscopic evaluation) (millimeters):  at least 33 mm 11 of 24 blocks
.
+ Architectural Patterns
+ ___ Comedo
+ ___ Solid
.
+ Nuclear Grade
+ ___ Grade III (high)
.
+ Necrosis
+ ___ Present, central (expansive "comedo" necrosis)
.
+ Lobular Carcinoma In Situ (LCIS)
+ ___ No LCIS in specimen
 
[place] [address]                    PATHOLOGY RPT PROCEDURE       
_______________________________________________________________________________
 
Name:       KEENAN VALENTINE           Room:           [address] [place]  
[facility].#:  [id number]      Account #:      [id number]  
Admission:  03/12/19     Date of Birth:  [birth year]  
Discharge:  03/14/19                   Report #:    [id number]
                                                         Path Case #: [id number]
_______________________________________________________________________________
.
Tumor Extension
Skin
___ Present and uninvolved.
.
Nipple
___ DCIS does not involve the nipple epidermis
.
Skeletal Muscle
___ No skeletal muscle is present.
.
Margins
Invasive Carcinoma Margins
___ Uninvolved by invasive carcinoma
Distance from closest margin (millimeters): 5 mm
Specify closest margin: Anterior inferior
.
DCIS Margins
___ Uninvolved by DCIS
Distance from closest margin (millimeters): 2mm
Specify closest margin: Anterior superior
.
Regional Lymph Nodes
___ Involved by tumor cells
Number of Lymph Nodes with Macrometastases (>2 mm): 1
+ Size of Largest Metastatic Deposit (millimeters): 5 mm
.
+ Extranodal Extension:
+ ___ Present
.
Number of Lymph Nodes Examined: 1
Number of [place] Nodes Examined: 1
.
Treatment Effect
___ No known presurgical therapy
.
+ Lymphovascular Invasion
+ ___ Present
.
+ Dermal Lymphovascular Invasion
+ ___ Present
.
Pathologic Stage Classification (pTNM, AJCC 8th Edition)
Primary Tumor (Invasive Carcinoma) (pT)
___ pT3:Tumor >50 mm in greatest dimension
.
Modifier
___ (sn):[place] node(s) evaluated.
.
 
[place] [address]                    PATHOLOGY RPT PROCEDURE       
_______________________________________________________________________________
 
Name:       KEENAN VALENTINE           Room:           [address] [place]  
[facility].#:  [id number]      Account #:      [id number]  
Admission:  03/12/19     Date of Birth:  [birth year]  
Discharge:  03/14/19                   Report #:    [id number]
                                                         Path Case #: [id number]
_______________________________________________________________________________
Category (pN)
___ pN1a:Metastases in 1 to 3 axillary lymph nodes, at least 1 metastasis
larger than 2.0 mm
.
Distant Metastasis (pM)
___ Unknown
.
.
Marker Studies previous biopsy [id number]
ER:  Positive 70%
VT:  Positive 5%
HER2:  Not overexpressed score 1+.
Ki67:  Lobular proliferation 10%.
.
(SHA:fransico; 03/15/2019)
.                                                                02
Electronically signed:                                     .
Randy Aguila MD, Pathologist
NPI- [id number]
.                                                                01
Gross description:                                         .
A. The specimen is received in formalin, labeled "Keenan Valentine, right
breast, short superior, long lateral", is an oriented simple modified
mastectomy of right breast weighing 2172 g and measuring 33 x 26 x 5.0 cm,
with and or laying, tan-white skin measuring 23 x 16 cm with a nipple
areola complex measuring 5.5 x 4.0 cm with an everted nipple measuring 1.0
cm in diameter.  The posterior surface is covered with smooth fascia and
in no skeletal muscle is present.  The specimen is oriented as follows:
Short suture = superior and long suture = lateral, and the specimen is
inked as follows: Anterior superior = blue, anterior inferior = green and
deep = black.  The specimen is serially section from medial to lateral to
reveal a yellow lobulated fatty cut surface with gray-white fibrous tissue
interspersed in the ratio 60:40.  No discrete masses are identified.
Representative tissue is submitted as follows:
A1. Nipple and skin
A2. Superior and inferior margins
A3. Medial and lateral margins (medial margin = inked orange)
A4. Deep margin
A5-A6. Upper outer quadrant
A7-A8. Lower outer quadrant
A9-A10. Lower inner quadrant
A11-A12.  Upper inner quadrant
The specimen was excised at: 1146 on 03/12/19, placed in formalin at: 1153
on 03/12/19, formalin exposure: Approximately 36 hours.
.
B. The specimen is received in formalin, labeled "Keenan Valentine, left
breast, short superior, long lateral", is an oriented simple mastectomy
specimen of left breast weighing 1736 g and measuring 28 x 30 x 6.0 cm
with an overlying tan-white skin measuring 26 x 12.5 cm that consist of a
 
[place] [address]                    PATHOLOGY RPT PROCEDURE       
_______________________________________________________________________________
 
Name:       KEENAN VALENTINE KEENA           Room:           [address] [place]  
[facility].#:  [id number]      Account #:      [id number]  
Admission:  03/12/19     Date of Birth:  [birth year]  
Discharge:  03/14/19                   Report #:    [id number]
                                                         Path Case #: [id number]
_______________________________________________________________________________
nipple areola complex measuring 4.5 x 4.0 cm with an everted 1.0 cm in
diameter nipple.  The posterior surface is covered with smooth fascia and
in no discrete skeletal muscle is identified.  The specimen is oriented as
follows: Short suture = superior and long suture = lateral.  The specimen
is inked as follows: Anterior superior = blue, anterior inferior = green
and the deep = black.  The specimen is serially sectioned from medial to
lateral to reveal a gray-white, stellate mass with few pale tan-yellow
hemorrhagic focuses with fibrous extensions measuring 5.2 x 2.7 x 1.5 cm
located in central aspect of the specimen and is greater than 2.5 cm from
all the margins.  The fibrous extension comes to within 0.2 cm of the
anterior superior, .5 cm from the anterior inferior and a greater than
2.5 cm from the remaining margins.  There is a tan-yellow focus that is
2.2 cm deep to the mass and > 1.5 cm from the deep margin.  The remaining
parenchyma is yellow lobulated with gray-white fibrous tissue interspersed
in the ratio 70:30.
Representative tissue is submitted as follows:
B1.  Nipple and skin
B2-B4.  Mass
B5-B8.  Mass with fibrous extension (orange and red ink at alternate
intersection)
B9.  Fibrous extension closest to anterior superior margin
B10.  Fibrous extension closest to anterior inferior margin
B11.  Tan-yellow focus
B12.  Superior margin closest to mass
B13.  Lateral margin closest to mass
B14.  Inferior margin closest to mass
B15.  Medial margin closest to mass
B16.  Deep margin closest to mass
B17-B18.  Upper outer quadrant
B19-B20.  Lower outer quadrant
B21-B22.  Lower inner quadrant
B23-B24. Upper inner quadrant
Specimen excised at: 1254 on 03/12/19, placed in formalin at: 1301 on
03/12/19, formalin exposure: Approximately 34 hours and 39 minutes.
.
C.The specimen is received in formalin, labeled "Keenan Valentine, left
sentinel lymph node", is an irregular fragment of yellow lobulated adipose
tissue measuring 3.5 x 2.6 x 1.0 cm, within which is a tan-pink, rubbery
lymph node measuring 2.8 x 1.0 x 0.7 cm.  The lymph node is serially
section to reveal a hemorrhagic to light green homogeneous cut surface.
The specimen is entirely submitted as follows:
C1.  Lymph node
C2. Remaning adipose tissue
(SWS; 03/13/2019)
SHS/SHS
.                                                                02
Pathologist provided ICD-10:
C50.912, D05.12, C77.3
.                                                                02
 
[place] [address]                    PATHOLOGY RPT PROCEDURE       
_______________________________________________________________________________
 
Name:       KEENAN VALENTINE           Room:           [address] [place]  
M.R.#:  [id number]      Account #:      [id number]  
Admission:  03/12/19     Date of Birth:  [birth year]  
Discharge:  03/14/19                   Report #:    [id number]
                                                         Path Case #: [id number]
_______________________________________________________________________________
CPT                                                        .
280002, 264597, 157326, K34914
Specimen Comment: A courtesy copy of this report has been sent to
Specimen Comment: [phone], [phone].
Specimen Comment: Report sent to  / DR MICHAEL
***Performed at:  01
   [facility] [address], [place], [place]  [id number]
   MD Derek Rodriguez MD Phone:  [phone]
***Performed at:  02
   [facility]
   [address], [place], [place]  [id number]
   MD Simone Kidd MD Phone:  [phone]

## 2019-03-29 ENCOUNTER — HOSPITAL ENCOUNTER (OUTPATIENT)
Dept: HOSPITAL 96 - M.CT | Age: 69
End: 2019-03-29
Attending: INTERNAL MEDICINE
Payer: MEDICARE

## 2019-03-29 DIAGNOSIS — Z91.09: ICD-10-CM

## 2019-03-29 DIAGNOSIS — R91.1: ICD-10-CM

## 2019-03-29 DIAGNOSIS — M41.84: ICD-10-CM

## 2019-03-29 DIAGNOSIS — C50.919: Primary | ICD-10-CM

## 2019-03-29 DIAGNOSIS — Z90.13: ICD-10-CM

## 2019-03-29 NOTE — NUR
CALL RECIEVED FROM CT REQUESTION PORT A CATH ACCESS FOR CT AND NUC MED. PORT A
CATH ACCESSED WITH OUT DIFFICULTY. GOOD BRISK BLOOD RETURN AND EASY FLUSH. CT
NOTIFIED AND CAME AND GOT PT. PT RETURNED TO INFUSION WITH NUC MED STAFF. PORT
A CATH FLUSHED AND DEACCESSED. TOLERATED WELL. DENIES QUESTIONS OR NEEDS AT
[facility].

## 2019-04-01 ENCOUNTER — HOSPITAL ENCOUNTER (OUTPATIENT)
Dept: HOSPITAL 96 - M.CRD | Age: 69
End: 2019-04-01
Attending: INTERNAL MEDICINE
Payer: MEDICARE

## 2019-04-01 DIAGNOSIS — I35.8: ICD-10-CM

## 2019-04-01 DIAGNOSIS — Z91.09: ICD-10-CM

## 2019-04-01 DIAGNOSIS — C50.919: ICD-10-CM

## 2019-04-01 DIAGNOSIS — Z01.818: Primary | ICD-10-CM

## 2019-04-01 NOTE — 2DMMODE
[place] [address]
Phone:  [phone] 2 D/M-MODE ECHOCARDIOGRAM     
_______________________________________________________________________________
 
Name:         KEENAN VALENTINE          Room:                     REG CLI
M.R.#:    [id number]     Account #:     [id number]  
Admission:    19    Attend Phys:   Tali Ribeiro MD   
Discharge:                Date of Birth: [birth year]  
Date of Service: 19 1617  Report #:      [id number]
        [id number]
_______________________________________________________________________________
THIS REPORT FOR:  //name//                      
 
 
--------------- APPROVED REPORT --------------
 
 
Study performed:  2019 12:49:27
 
EXAM: Comprehensive 2D, Doppler, and color-flow 
Echocardiogram 
Patient Location: Out-Patient   
 
      BSA:         2.40
HR: 69 bpm BP:          140/88 mmHg 
 
Other Information 
Study Quality: Fair
 
Indications
Pre-Chemo
 
2D Dimensions
IVSd:  12.83 (7-11mm) LVOT Diam:  19.44 (18-24mm) 
LVDd:  51.12 mm  
PWd:  9.91 (7-11mm) Ascending Ao:  31.81 (22-36mm)
LVDs:  34.03 (25-40mm) 
Aortic Root:  28.51 mm 
 
Volumes
Left Atrial Volume (Systole) 
    LA ESV Index:  19.70 mL/m2
 
Aortic Valve
AoV Peak Martinez.:  1.35 m/s 
AO Peak Gr.:  7.28 mmHg  LVOT Max PG:  3.84 mmHg
AO Mean Gr.:  4.61 mmHg  LVOT Mean P.81 mmHg
    LVOT Max V:  0.98 m/s
AO V2 VTI:  27.69 cm  LVOT Mean V:  0.62 m/s
MELVINA (VTI):  2.32 cm2  LVOT V1 VTI:  21.68 cm
 
Mitral Valve
    E/A Ratio:  0.80
    MV Decel. Time:  225.84 ms
MV E Max Martinez.:  0.59 m/s 
MV PHT:  65.49 ms  
MVA (PHT):  3.36 cm2  
 
 
[place] [address]
Phone:  [phone]                     2 D/M-MODE ECHOCARDIOGRAM     
_______________________________________________________________________________
 
Name:         KEENAN VALENTINE          Room:                     REG CLI
M.R.#:    [id number]     Account #:     [id number]  
Admission:    19    Attend Phys:   Tali Ribeiro MD   
Discharge:                Date of Birth: [birth year]  
Date of Service: 19 1617  Report #:      [id number]
        [id number]
_______________________________________________________________________________
 
TDI
E/Lateral E':  8.43 E/Medial E':  4.92
   Medial E' Martinez.:  0.12 m/s
   Lateral E' Martinez.:  0.07 m/s
 
Pulmonary Valve
PV Peak Martinez.:  0.89 m/s PV Peak Gr.:  3.17 mmHg
 
Tricuspid Valve
    RAP Estimate:  5.00 mmHg
TR Peak Gr.:  26.18 mmHg RVSP:  31.18 mmHg
    PA Pressure:  31.18 mmHg
 
Left Ventricle
The left ventricle is normal size. There is normal LV segmental wall 
motion. There is normal left ventricular wall thickness. Left 
ventricular systolic function is normal. The left ventricular 
ejection fraction is within the normal range. LVEF is 60-65%. The 
left ventricular diastolic function is normal.
 
Right Ventricle
The right ventricle is normal size. The right ventricular systolic 
function is normal.
 
Atria
The left atrium size is normal. The right atrium size is 
normal.
 
Aortic Valve
Mild aortic valve sclerosis. No aortic regurgitation is present. 
There is no aortic valvular stenosis.
 
Mitral Valve
The mitral valve is normal in structure. There is no mitral valve 
regurgitation noted. No evidence of mitral valve stenosis.
 
Tricuspid Valve
The tricuspid valve is normal in structure. Trace tricuspid 
regurgitation.
 
Pulmonic Valve
The pulmonary valve is normal in structure. There is no pulmonic 
valvular regurgitation.
 
[facility]
 
 
[place] [address]
Phone:  [phone] 2 D/M-MODE ECHOCARDIOGRAM     
_______________________________________________________________________________
 
Name:         KEENAN VALENTINENE          Room:                     REG CLI
M.R.#:    [id number]     Account #:     [id number]  
Admission:    19    Attend Phys:   Tali Ribeiro MD   
Discharge:                Date of Birth: [birth year]  
Date of Service: 19 1617  Report #:      [id number]
        [id number]
_______________________________________________________________________________
The aortic root is normal in size. IVC is normal in size and 
collapses >50% with inspiration.
 
Pericardium
There is no pericardial effusion.
 
<Conclusion>
The left ventricle is normal size.
Left ventricular systolic function is normal.
The left ventricular ejection fraction is within the normal 
range.
LVEF is 60-65%.
The left ventricular diastolic function is normal.
The right ventricle is normal size.
The left atrium size is normal.
Mild aortic valve sclerosis.
No aortic regurgitation is present.
There is no aortic valvular stenosis.
The mitral valve is normal in structure.
The tricuspid valve is normal in structure.
IVC is normal in size and collapses >50% with inspiration.
There is no pericardial effusion.
There is normal LV segmental wall motion.
 
 
 
 
 
 
 
 
 
 
 
 
 
 
 
 
 
 
 
 
 
  <ELECTRONICALLY SIGNED>
                                           By: Doug Remy MD, FACC     
  19     1617
D: 19 1617   _____________________________________
T: 19 161   Doug Remy MD, FACC       /INF

## 2019-10-25 ENCOUNTER — HOSPITAL ENCOUNTER (OUTPATIENT)
Dept: HOSPITAL 96 - M.ULTRA | Age: 69
End: 2019-10-25
Attending: INTERNAL MEDICINE
Payer: MEDICARE

## 2019-10-25 DIAGNOSIS — Z17.0: ICD-10-CM

## 2019-10-25 DIAGNOSIS — R22.2: ICD-10-CM

## 2019-10-25 DIAGNOSIS — C50.911: Primary | ICD-10-CM

## 2019-11-07 ENCOUNTER — HOSPITAL ENCOUNTER (OUTPATIENT)
Dept: HOSPITAL 96 - M.CT | Age: 69
End: 2019-11-07
Attending: SURGERY
Payer: MEDICARE

## 2019-11-07 DIAGNOSIS — Z90.13: ICD-10-CM

## 2019-11-07 DIAGNOSIS — K57.30: Primary | ICD-10-CM

## 2019-11-08 ENCOUNTER — HOSPITAL ENCOUNTER (OUTPATIENT)
Dept: HOSPITAL 96 - M.RTH | Age: 69
End: 2019-11-08
Attending: RADIOLOGY
Payer: MEDICARE

## 2019-11-08 DIAGNOSIS — Z85.3: ICD-10-CM

## 2019-11-08 DIAGNOSIS — E03.9: ICD-10-CM

## 2019-11-08 DIAGNOSIS — I10: ICD-10-CM

## 2019-11-08 DIAGNOSIS — Z08: Primary | ICD-10-CM

## 2019-11-14 ENCOUNTER — HOSPITAL ENCOUNTER (OUTPATIENT)
Dept: HOSPITAL 96 - M.ULTRA | Age: 69
End: 2019-11-14
Attending: SURGERY
Payer: MEDICARE

## 2019-11-14 DIAGNOSIS — R19.03: Primary | ICD-10-CM

## 2019-11-26 ENCOUNTER — HOSPITAL ENCOUNTER (INPATIENT)
Dept: HOSPITAL 96 - M.ERS | Age: 69
LOS: 1 days | Discharge: HOME | DRG: 69 | End: 2019-11-27
Attending: INTERNAL MEDICINE | Admitting: INTERNAL MEDICINE
Payer: MEDICARE

## 2019-11-26 VITALS — WEIGHT: 293 LBS | HEIGHT: 65 IN | BODY MASS INDEX: 48.82 KG/M2

## 2019-11-26 VITALS — DIASTOLIC BLOOD PRESSURE: 82 MMHG | SYSTOLIC BLOOD PRESSURE: 147 MMHG

## 2019-11-26 VITALS — DIASTOLIC BLOOD PRESSURE: 71 MMHG | SYSTOLIC BLOOD PRESSURE: 146 MMHG

## 2019-11-26 VITALS — SYSTOLIC BLOOD PRESSURE: 148 MMHG | DIASTOLIC BLOOD PRESSURE: 85 MMHG

## 2019-11-26 DIAGNOSIS — E66.01: ICD-10-CM

## 2019-11-26 DIAGNOSIS — Z90.49: ICD-10-CM

## 2019-11-26 DIAGNOSIS — J45.909: ICD-10-CM

## 2019-11-26 DIAGNOSIS — F41.9: ICD-10-CM

## 2019-11-26 DIAGNOSIS — E03.9: ICD-10-CM

## 2019-11-26 DIAGNOSIS — G45.9: Primary | ICD-10-CM

## 2019-11-26 DIAGNOSIS — I10: ICD-10-CM

## 2019-11-26 DIAGNOSIS — H81.10: ICD-10-CM

## 2019-11-26 DIAGNOSIS — Z90.710: ICD-10-CM

## 2019-11-26 DIAGNOSIS — Z88.0: ICD-10-CM

## 2019-11-26 DIAGNOSIS — Z90.13: ICD-10-CM

## 2019-11-26 LAB
ABSOLUTE BASOPHILS: 0 THOU/UL (ref 0–0.2)
ABSOLUTE EOSINOPHILS: 0.3 THOU/UL (ref 0–0.7)
ABSOLUTE MONOCYTES: 0.7 THOU/UL (ref 0–1.2)
ALBUMIN SERPL-MCNC: 3.1 G/DL (ref 3.4–5)
ALP SERPL-CCNC: 68 U/L (ref 46–116)
ALT SERPL-CCNC: 31 U/L (ref 30–65)
ANION GAP SERPL CALC-SCNC: 9 MMOL/L (ref 7–16)
APTT BLD: 29.9 SECONDS (ref 25–31.3)
AST SERPL-CCNC: 28 U/L (ref 15–37)
BASOPHILS NFR BLD AUTO: 0.3 %
BILIRUB SERPL-MCNC: 0.8 MG/DL
BILIRUB UR-MCNC: NEGATIVE MG/DL
BUN SERPL-MCNC: 13 MG/DL (ref 7–18)
CALCIUM SERPL-MCNC: 8.3 MG/DL (ref 8.5–10.1)
CHLORIDE SERPL-SCNC: 104 MMOL/L (ref 98–107)
CO2 SERPL-SCNC: 28 MMOL/L (ref 21–32)
COLOR UR: YELLOW
CREAT SERPL-MCNC: 0.8 MG/DL (ref 0.6–1.3)
EOSINOPHIL NFR BLD: 7.6 %
GLUCOSE SERPL-MCNC: 97 MG/DL (ref 70–99)
GRANULOCYTES NFR BLD MANUAL: 57.9 %
HCT VFR BLD CALC: 38.9 % (ref 37–47)
HGB BLD-MCNC: 13.4 GM/DL (ref 12–15)
INR PPP: 1
KETONES UR STRIP-MCNC: NEGATIVE MG/DL
LYMPHOCYTES # BLD: 0.9 THOU/UL (ref 0.8–5.3)
LYMPHOCYTES NFR BLD AUTO: 19.7 %
MCH RBC QN AUTO: 32.2 PG (ref 26–34)
MCHC RBC AUTO-ENTMCNC: 34.4 G/DL (ref 28–37)
MCV RBC: 93.7 FL (ref 80–100)
MONOCYTES NFR BLD: 14.5 %
MPV: 8.8 FL. (ref 7.2–11.1)
NEUTROPHILS # BLD: 2.7 THOU/UL (ref 1.6–8.1)
NT-PRO BRAIN NAT PEPTIDE: 91 PG/ML (ref ?–300)
NUCLEATED RBCS: 0 /100WBC
PLATELET COUNT*: 161 THOU/UL (ref 150–400)
POTASSIUM SERPL-SCNC: 3.4 MMOL/L (ref 3.5–5.1)
PROT SERPL-MCNC: 6.8 G/DL (ref 6.4–8.2)
PROT UR QL STRIP: NEGATIVE
PROTHROMBIN TIME: 10.2 SECONDS (ref 9.2–11.5)
RBC # BLD AUTO: 4.16 MIL/UL (ref 4.2–5)
RBC # UR STRIP: NEGATIVE /UL
RDW-CV: 18.4 % (ref 10.5–14.5)
SODIUM SERPL-SCNC: 141 MMOL/L (ref 136–145)
SP GR UR STRIP: 1.01 (ref 1–1.03)
URINE CLARITY: CLEAR
URINE GLUCOSE-RANDOM: NEGATIVE
URINE LEUKOCYTES-REFLEX: NEGATIVE
URINE NITRITE-REFLEX: NEGATIVE
UROBILINOGEN UR STRIP-ACNC: 0.2 E.U./DL (ref 0.2–1)
WBC # BLD AUTO: 4.6 THOU/UL (ref 4–11)

## 2019-11-26 NOTE — NUR
RECEIVED PT FROM ER 1550. SITUATED TO ROOM. TELE IN PLACED TRACING SINUS
RHYTHM ON TELE. PT COMPLAINS OF NAUSEA AND DIZZINESS. PT NPO. PT ON IV FLUIDS.
PT AOX4, UP WITH ASSIST, O2 SAT 90'S RA. PT L LIMB ALERT. PT HAS NEUROLOGY
CONSULT. PT REFUSING MRI, PT STATE SHE IS CLAUSTROPHOBIC. PT FOR US CAROTID
AND HEART ECHO. VSS, HOURLY ROUNDING, CALL LIGHT WITHIN REACH, WILL CONTINUE
TO MONITOR.

## 2019-11-26 NOTE — NUR
LUIS E NOTIFIED UPON PT RETURNAT 1225
 FROM CT AND NOTIFIED THE PATIENT IS THROWING UP
AND WERE UNABLE TO COMPLETE EXAMS

## 2019-11-26 NOTE — EKG
[place] [address]
Phone:  [phone]                     ELECTROCARDIOGRAM REPORT      
_______________________________________________________________________________
 
Name:       KEENAN VALENTINE           Room:           [place] [address]   [place]  
..#:  [id number]      Account #:      [id number]  
Admission:  19     Attend Phys:    DAKOTAH Sandoval
Discharge:               Date of Birth:  [birth year]  
         Report #: [id number]
    [id number]
_______________________________________________________________________________
THIS REPORT FOR:  //name//                      
 
                         [facility] ED
                                       
Test Date:    2019               Test Time:    11:25:23
Pat Name:     KEENAN VALENTINE           Department:   
Patient ID:   [id number]            Room:         [facility]
Gender:       F                        Technician:   
:          1950               Requested By: Kenny Carrera
Order Number: [id number] MD:   Hitesh Fuentes
                                 Measurements
Intervals                              Axis          
Rate:         76                       P:            38
WA:           43                       QRS:          54
QRSD:         122                      T:            36
QT:           436                                    
QTc:          491                                    
                           Interpretive Statements
Sinus rhythm
Ventricular premature complex
 
Nonspecific intraventricular conduction delay
Consider anterior infarct
Compared to ECG 10/22/2017 06:15:30
Ventricular premature complex(es) now present
 
First degree AV block no longer present
Myocardial infarct finding still present
 
Electronically Signed On 2019 15:30:52 CST by Hitesh Fuentes
[email or web address]
 
 
 
 
 
 
 
 
 
 
 
 
  <ELECTRONICALLY SIGNED>
                                           By: Hitesh Fuentes MD, FAC      
  19     1530
D: 19 1125   _____________________________________
T: 19 1125   Hitesh Fuentes MD, FAC        /EPI

## 2019-11-27 VITALS — DIASTOLIC BLOOD PRESSURE: 53 MMHG | SYSTOLIC BLOOD PRESSURE: 137 MMHG

## 2019-11-27 VITALS — SYSTOLIC BLOOD PRESSURE: 121 MMHG | DIASTOLIC BLOOD PRESSURE: 73 MMHG

## 2019-11-27 VITALS — DIASTOLIC BLOOD PRESSURE: 89 MMHG | SYSTOLIC BLOOD PRESSURE: 146 MMHG

## 2019-11-27 VITALS — DIASTOLIC BLOOD PRESSURE: 59 MMHG | SYSTOLIC BLOOD PRESSURE: 130 MMHG

## 2019-11-27 VITALS — SYSTOLIC BLOOD PRESSURE: 139 MMHG | DIASTOLIC BLOOD PRESSURE: 68 MMHG

## 2019-11-27 VITALS — SYSTOLIC BLOOD PRESSURE: 132 MMHG | DIASTOLIC BLOOD PRESSURE: 63 MMHG

## 2019-11-27 VITALS — SYSTOLIC BLOOD PRESSURE: 134 MMHG | DIASTOLIC BLOOD PRESSURE: 60 MMHG

## 2019-11-27 VITALS — DIASTOLIC BLOOD PRESSURE: 83 MMHG | SYSTOLIC BLOOD PRESSURE: 144 MMHG

## 2019-11-27 NOTE — 2DMMODE
[place] [address]
Phone:  [phone] 2 D/M-MODE ECHOCARDIOGRAM     
_______________________________________________________________________________
 
Name:         KEENAN VALENTINE          Room:          [address] [place] 
[facility]#:    [id number]     Account #:     [id number]  
Admission:    19    Attend Phys:   Manny Hebert
Discharge:                Date of Birth: [birth year]  
Date of Service: 19 1137  Report #:      [id number]
        [id number]
_______________________________________________________________________________
THIS REPORT FOR:  //name//                      
 
 
--------------- APPROVED REPORT --------------
 
 
Study performed:  2019 10:27:29
 
EXAM: Comprehensive 2D, Doppler, and color-flow 
Echocardiogram 
Patient Location: In-Patient   
Room #:  218     Status:  routine
 
      BSA:         2.45
HR: 60 bpm BP:          134/60 mmHg 
Rhythm: NSR     
 
Other Information 
Study Quality: Good
 
Indications
dizziness
 
2D Dimensions
IVSd:  11.58 (7-11mm) LVOT Diam:  19.78 (18-24mm) 
LVDd:  45.70 mm  
PWd:  11.76 (7-11mm) Ascending Ao:  34.00 (22-36mm)
LVDs:  30.06 (25-40mm) 
Aortic Root:  28.54 mm 
 
Volumes
Left Atrial Volume (Systole) 
    LA ESV Index:  22.20 mL/m2
 
Aortic Valve
AoV Peak Martinez.:  1.32 m/s 
AO Peak Gr.:  6.97 mmHg  LVOT Max PG:  3.84 mmHg
AO Mean Gr.:  4.12 mmHg  LVOT Mean P.07 mmHg
    LVOT Max V:  0.98 m/s
AO V2 VTI:  31.73 cm  LVOT Mean V:  0.67 m/s
MELVINA (VTI):  2.42 cm2  LVOT V1 VTI:  24.99 cm
 
Mitral Valve
    E/A Ratio:  1.22
    MV Decel. Time:  198.91 ms
MV E Max Martinez.:  0.81 m/s 
 
 
[place] [address]
Phone:  [phone]                     2 D/M-MODE ECHOCARDIOGRAM     
_______________________________________________________________________________
 
Name:         KEENAN VALENTINE          Room:          [address] [place] 
[facility].#:    [id number]     Account #:     [id number]  
Admission:    19    Attend Phys:   Manny Hebert
Discharge:                Date of Birth: [birth year]  
Date of Service: 19 1137  Report #:      [id number]
        [id number]
_______________________________________________________________________________
MV PHT:  57.68 ms  
MVA (PHT):  3.81 cm2  
 
TDI
E/Lateral E':  7.36 E/Medial E':  6.23
   Medial E' Martinez.:  0.13 m/s
   Lateral E' Martinez.:  0.11 m/s
 
Pulmonary Valve
PV Peak Martinez.:  0.90 m/s PV Peak Gr.:  3.21 mmHg
 
Tricuspid Valve
    RAP Estimate:  5.00 mmHg
TR Peak Gr.:  23.35 mmHg RVSP:  28.00 mmHg
    PA Pressure:  28.00 mmHg
 
Left Ventricle
The left ventricle is normal size. There is normal LV segmental wall 
motion. Mild concentric left ventricular hypertrophy. Left 
ventricular systolic function is normal. The left ventricular 
ejection fraction is within the normal range. LVEF is 60%. The left 
ventricular diastolic function is normal.
 
Right Ventricle
The right ventricle is normal size. The right ventricular systolic 
function is normal.
 
Atria
The left atrium size is normal. The right atrium size is 
normal.
 
Aortic Valve
The aortic valve is normal in structure. No aortic regurgitation is 
present. There is no aortic valvular stenosis.
 
Mitral Valve
The mitral valve is normal in structure. Trace mitral regurgitation. 
No evidence of mitral valve stenosis.
 
Tricuspid Valve
The tricuspid valve is normal in structure. Trace tricuspid 
regurgitation. estimated pa pressure 30 mm Hg
 
Pulmonic Valve
The pulmonary valve is normal in structure. There is no pulmonic 
valvular regurgitation.
 
 
[place] [address]
Phone:  [phone]                     2 D/M-MODE ECHOCARDIOGRAM     
_______________________________________________________________________________
 
Name:         KEENAN VALENTINE          Room:          [address] [place] 
[facility]#:    [id number]     Account #:     [id number]  
Admission:    19    Attend Phys:   Manny Hebert
Discharge:                Date of Birth: [birth year]  
Date of Service: 19 1137  Report #:      [id number]
        [id number]
_______________________________________________________________________________
 
Great Vessels
The aortic root is normal in size. IVC is normal in size and 
collapses >50% with inspiration.
 
Pericardium
There is no pericardial effusion.
 
<Conclusion>
Mild concentric left ventricular hypertrophy.
LVEF is 60%.
 
 
 
 
 
 
 
 
 
 
 
 
 
 
 
 
 
 
 
 
 
 
 
 
 
 
 
 
 
 
 
 
 
  <ELECTRONICALLY SIGNED>
                                           By: Hitesh Fuentes MD, FACC      
  19     1137
D: 19 113   _____________________________________
T: 19 113   Hitesh Fuentes MD, FACC        /INF

## 2019-11-27 NOTE — NUR
DISCHARGE PLAN DISCUSS WITH THE PT. TELE, IV REMOVED.  MEDICATION PACKET
GIVEN. REMINDED TO FOLLOW UP WITH NEUROLOGY, OUTPATIENT MRI ADVISED. OT/PT
SEEN THE PATIENT. PT REMINDED TO FOLLOW UP WITH ENT, PCP. PT COMMUNICATE
UNDERSTANDING. ALL BELONGINGS PACKED AND CHECKED.  LEFT THE UNIT AT 1540.

## 2019-11-27 NOTE — NUR
ASSUMED PT CARE AT 0800, A0X4, UP SBA, O2 SAT 90'S RA. TRACING SR ON TELE. PT
DENIES PAIN, DENIES NAUSEA/VOMITING. PT ORTHOSTATIC BP TAKEN AND CHARTED. PT
DENIES DIZZINESS THIS SHIFT. PT DIET ADVANCE TO HEART HEALTHY.  TOLERATING
WELL. PT FOR PT/OT EVAL. PT FOR DISCHARGE. PT HAD ECHO AND US CAROTID. VSS, AM
ASSESSMENT AS CHARTED, MEDS GIVEN PER MAR, CALL LIGHT WITHIN REACH, WILL
CONTINUE TO MONITOR.

## 2019-11-27 NOTE — NUR
PT IS ABLE TO COMMUNICATE HER NEEDS TO STAFF EFFECTIVELY. CURRENT PAIN
MEDICATION REGIMEN HAS BEEN ADEQUATE FOR CONTROLLING HER PAIN UP TO THIS TIME.
SHE HAS BEEN WEARING HER CPAP WHILE SLEEPING SO FAR THIS SHIFT. PT HAS
REPORTEDLY REFUSED MRIs D/T CLAUSTROPHOBIA. PT IS TENTATIVELY SCHEDULED TO
HAVE AN ECHO AND US OF CAROTIDS LATER TODAY.

## 2019-12-06 NOTE — CON
[facility] 
[address] [place]  [address]                    CONSULTATION                  
_______________________________________________________________________________
 
Name:       MEMEKEENAN           Room:           [address] [place]  
M.R.#:  [id number]      Account #:      [id number]  
Admission:  19     Attend Phys:    DAKOTAH Sandoval
Discharge:  19     Date of Birth:  [birth year]  
         Report #: [id number]
                                                                     [id number]  
_______________________________________________________________________________
THIS REPORT FOR:  //name//                      
 
CC: Keenan Hebert
 
DATE OF SERVICE:  2019
 
 
HISTORY OF PRESENT ILLNESS:  This is a 69-year-old female patient who was seen
by me to evaluate the patient for any neurological etiology for the patient's
dizziness.  This patient indicated that yesterday she had some dizziness, some
heaviness in the ears and some nausea and vomiting.  The symptoms have resolved
since that time.  She had these symptoms intermittently in the past for a long
time.  She has not had any evaluation in that regard.  She does not have any
dizziness and she does not know if anything made the dizziness better or worse
when it happened.
 
REVIEW OF SYSTEMS:  Indicates she is using a walker here.  She does not know
whether she can walk without a walker or not.  She is on multiple medications,
but she does not think the dizziness was postural.  Review of systems indicates
that she follows up with an oncologist and she takes chemo.  She has a history
of asthma, morbid obesity, hysterectomy, tubal ligations screws in the ankles,
history of panic attack and stress.  She feels back to her baseline and she does
not believe that she has any new eye, ENT, cardiac, respiratory, GI, ,
musculoskeletal, constitutional, dermatological, hematological, psychiatric,
throat, allergic symptom associated with present symptomatology besides above.
 
PAST MEDICAL HISTORY:  Positive for cancer and on chemotherapy.
 
FAMILY HISTORY:  Negative for any early age stroke.
 
SOCIAL HISTORY:  She does not smoke or drink any alcohol.
 
PHYSICAL EXAMINATION:  Indicate she is alert, responsive, able to follow simple
and complex command.  Her speech, concentration, fund of knowledge and memory is
at her baseline.  Cranial nerve examination 2-12 indicates no definite
abnormality.  She has a symmetrical strength, sensation, reflexes and tone in
all 4 extremities.  Her position sense is intact.  Neuromuscular examination is
symmetrical.  I could not look at the patient's fundus.  There is no carotid
bruit.  There is no meningeal sign.  She is morbidly obese.  Her hearing and
vision is adequate.  She has no dysmorphic features of face.  Cardiac
examination is unremarkable.  No marked respiratory difficulty or rhonchi was
noticed in this patient.
 
She did have a CT scan of the head and a carotid Doppler.  That does not show
any definite abnormality except a sinus disease.
 
 
 
[place] [address]                    CONSULTATION                  
_______________________________________________________________________________
 
Name:       KEENAN VALENTINE KEENA           Room:           [address] [place]  
M.R.#:  [id number]      Account #:      [id number]  
Admission:  19     Attend Phys:    DAKOTAH Sandoval
Discharge:  19     Date of Birth:  [birth year]  
         Report #: [id number]
                                                                     [id number]  
_______________________________________________________________________________
 
LABORATORY DATA:  Her white count is 4.6.  Potassium was trace low at 3.4.
 
IMPRESSION:  The most likely etiology for the patient's dizziness is ENT. 
Transient ischemic attacks are less likely, but cannot be fully excluded.
 
The situation is complicated because the patient said she will not do MRI here. 
Subsequently, she agreed to do an open MRI, but here she will not try even after
sedation.  That makes it difficult to define the diagnosis.  In these
circumstances, I will suggest the followin.  Get evaluation by PT, OT.  If she is stable on her feet, it may be desirable
to let her go home, but set up an MRI of the brain, MRA of the head and neck as
an outpatient as soon as it can be done.
2.  Alternative thing is to do a CT angiogram of the head and neck.  Her GFR is
okay now, but it was low, an MRI will be more safer on her.
3.  I will suggest putting her on aspirin for the time being.
4.  I will ask to get a lipid profile done.
5.  We will check for any postural hypotension.
 
Thank you very much for this referral, and if you have any questions, please
feel free to contact me.  I discussed all of it with the patient and she is
agreeable with this plan.
 
 
 
 
 
 
 
 
 
 
 
 
 
 
 
 
 
 
 
 
 
 
<ELECTRONICALLY SIGNED>
                                        By:  Gorge Chavez MD         
19     1336
D: 19 0951_______________________________________
T: 19 1056Gorge Chavez MD            /nt

## 2019-12-16 ENCOUNTER — HOSPITAL ENCOUNTER (OUTPATIENT)
Dept: HOSPITAL 96 - M.RAD | Age: 69
End: 2019-12-16
Attending: INTERNAL MEDICINE
Payer: MEDICARE

## 2019-12-16 DIAGNOSIS — C50.412: ICD-10-CM

## 2019-12-16 DIAGNOSIS — Z78.0: ICD-10-CM

## 2019-12-16 DIAGNOSIS — Z17.0: ICD-10-CM

## 2019-12-16 DIAGNOSIS — N91.2: Primary | ICD-10-CM

## 2019-12-31 ENCOUNTER — HOSPITAL ENCOUNTER (OUTPATIENT)
Dept: HOSPITAL 96 - M.CT | Age: 69
End: 2019-12-31
Attending: INTERNAL MEDICINE
Payer: MEDICARE

## 2019-12-31 DIAGNOSIS — Z17.0: ICD-10-CM

## 2019-12-31 DIAGNOSIS — K76.0: Primary | ICD-10-CM

## 2019-12-31 DIAGNOSIS — R91.1: ICD-10-CM

## 2019-12-31 DIAGNOSIS — C50.412: ICD-10-CM

## 2019-12-31 DIAGNOSIS — I11.9: ICD-10-CM

## 2019-12-31 DIAGNOSIS — K44.9: ICD-10-CM

## 2019-12-31 DIAGNOSIS — J98.4: ICD-10-CM

## 2019-12-31 DIAGNOSIS — Z90.13: ICD-10-CM

## 2019-12-31 DIAGNOSIS — K57.30: ICD-10-CM
